# Patient Record
Sex: FEMALE | Race: BLACK OR AFRICAN AMERICAN | NOT HISPANIC OR LATINO | Employment: UNEMPLOYED | URBAN - METROPOLITAN AREA
[De-identification: names, ages, dates, MRNs, and addresses within clinical notes are randomized per-mention and may not be internally consistent; named-entity substitution may affect disease eponyms.]

---

## 2024-08-20 ENCOUNTER — HOSPITAL ENCOUNTER (EMERGENCY)
Facility: CLINIC | Age: 54
Discharge: HOME | End: 2024-08-20
Attending: EMERGENCY MEDICINE | Admitting: EMERGENCY MEDICINE
Payer: COMMERCIAL

## 2024-08-20 VITALS
HEIGHT: 67 IN | OXYGEN SATURATION: 100 % | HEART RATE: 86 BPM | WEIGHT: 172.4 LBS | SYSTOLIC BLOOD PRESSURE: 157 MMHG | TEMPERATURE: 97.5 F | RESPIRATION RATE: 20 BRPM | BODY MASS INDEX: 27.06 KG/M2 | DIASTOLIC BLOOD PRESSURE: 99 MMHG

## 2024-08-20 DIAGNOSIS — N12 PYELONEPHRITIS: ICD-10-CM

## 2024-08-20 DIAGNOSIS — M79.10 MYALGIA: ICD-10-CM

## 2024-08-20 LAB
ALBUMIN UR-MCNC: NEGATIVE MG/DL
ANION GAP SERPL CALCULATED.3IONS-SCNC: 12 MMOL/L (ref 7–15)
APPEARANCE UR: CLEAR
BACTERIA #/AREA URNS HPF: ABNORMAL /HPF
BASOPHILS # BLD AUTO: 0 10E3/UL (ref 0–0.2)
BASOPHILS NFR BLD AUTO: 0 %
BILIRUB UR QL STRIP: NEGATIVE
BUN SERPL-MCNC: 6.6 MG/DL (ref 6–20)
CALCIUM SERPL-MCNC: 9.3 MG/DL (ref 8.8–10.4)
CHLORIDE SERPL-SCNC: 104 MMOL/L (ref 98–107)
COLOR UR AUTO: ABNORMAL
CREAT SERPL-MCNC: 0.62 MG/DL (ref 0.51–0.95)
EGFRCR SERPLBLD CKD-EPI 2021: >90 ML/MIN/1.73M2
EOSINOPHIL # BLD AUTO: 0.1 10E3/UL (ref 0–0.7)
EOSINOPHIL NFR BLD AUTO: 1 %
ERYTHROCYTE [DISTWIDTH] IN BLOOD BY AUTOMATED COUNT: 12.5 % (ref 10–15)
GLUCOSE SERPL-MCNC: 93 MG/DL (ref 70–99)
GLUCOSE UR STRIP-MCNC: NEGATIVE MG/DL
HCO3 SERPL-SCNC: 24 MMOL/L (ref 22–29)
HCT VFR BLD AUTO: 43.6 % (ref 35–47)
HGB BLD-MCNC: 14.3 G/DL (ref 11.7–15.7)
HGB UR QL STRIP: NEGATIVE
HOLD SPECIMEN: NORMAL
HOLD SPECIMEN: NORMAL
IMM GRANULOCYTES # BLD: 0 10E3/UL
IMM GRANULOCYTES NFR BLD: 0 %
KETONES UR STRIP-MCNC: NEGATIVE MG/DL
LEUKOCYTE ESTERASE UR QL STRIP: ABNORMAL
LYMPHOCYTES # BLD AUTO: 2.6 10E3/UL (ref 0.8–5.3)
LYMPHOCYTES NFR BLD AUTO: 36 %
MCH RBC QN AUTO: 28.4 PG (ref 26.5–33)
MCHC RBC AUTO-ENTMCNC: 32.8 G/DL (ref 31.5–36.5)
MCV RBC AUTO: 87 FL (ref 78–100)
MONOCYTES # BLD AUTO: 0.6 10E3/UL (ref 0–1.3)
MONOCYTES NFR BLD AUTO: 8 %
NEUTROPHILS # BLD AUTO: 3.9 10E3/UL (ref 1.6–8.3)
NEUTROPHILS NFR BLD AUTO: 54 %
NITRATE UR QL: NEGATIVE
NRBC # BLD AUTO: 0 10E3/UL
NRBC BLD AUTO-RTO: 0 /100
PH UR STRIP: 7.5 [PH] (ref 5–7)
PLATELET # BLD AUTO: 281 10E3/UL (ref 150–450)
POTASSIUM SERPL-SCNC: 3.6 MMOL/L (ref 3.4–5.3)
RBC # BLD AUTO: 5.03 10E6/UL (ref 3.8–5.2)
RBC URINE: 0 /HPF
SODIUM SERPL-SCNC: 140 MMOL/L (ref 135–145)
SP GR UR STRIP: 1 (ref 1–1.03)
SQUAMOUS EPITHELIAL: <1 /HPF
UROBILINOGEN UR STRIP-MCNC: NORMAL MG/DL
WBC # BLD AUTO: 7.2 10E3/UL (ref 4–11)
WBC URINE: 12 /HPF

## 2024-08-20 PROCEDURE — 80048 BASIC METABOLIC PNL TOTAL CA: CPT | Performed by: EMERGENCY MEDICINE

## 2024-08-20 PROCEDURE — 99283 EMERGENCY DEPT VISIT LOW MDM: CPT

## 2024-08-20 PROCEDURE — 85025 COMPLETE CBC W/AUTO DIFF WBC: CPT | Performed by: EMERGENCY MEDICINE

## 2024-08-20 PROCEDURE — 36415 COLL VENOUS BLD VENIPUNCTURE: CPT | Performed by: EMERGENCY MEDICINE

## 2024-08-20 PROCEDURE — 81001 URINALYSIS AUTO W/SCOPE: CPT | Performed by: EMERGENCY MEDICINE

## 2024-08-20 PROCEDURE — 87086 URINE CULTURE/COLONY COUNT: CPT | Performed by: EMERGENCY MEDICINE

## 2024-08-20 RX ORDER — CEPHALEXIN 500 MG/1
500 CAPSULE ORAL 4 TIMES DAILY
Qty: 40 CAPSULE | Refills: 0 | Status: SHIPPED | OUTPATIENT
Start: 2024-08-20 | End: 2024-08-30

## 2024-08-20 ASSESSMENT — COLUMBIA-SUICIDE SEVERITY RATING SCALE - C-SSRS
1. IN THE PAST MONTH, HAVE YOU WISHED YOU WERE DEAD OR WISHED YOU COULD GO TO SLEEP AND NOT WAKE UP?: NO
2. HAVE YOU ACTUALLY HAD ANY THOUGHTS OF KILLING YOURSELF IN THE PAST MONTH?: NO
6. HAVE YOU EVER DONE ANYTHING, STARTED TO DO ANYTHING, OR PREPARED TO DO ANYTHING TO END YOUR LIFE?: NO

## 2024-08-20 ASSESSMENT — ACTIVITIES OF DAILY LIVING (ADL): ADLS_ACUITY_SCORE: 33

## 2024-08-20 NOTE — ED PROVIDER NOTES
"  Emergency Department Note      History of Present Illness     Chief Complaint   Headache and Dysuria    Interpretor used for history    HPI   Kiya Dominguez is a 54 year old female who presents for back pain localized to both her kidneys.  She endorses dysuria, headache, leg aches. She reports not having to pee frequently but then when she does it is very urgent. She reports constant pain with 1-2 minute breaks in the pain. She denies vomiting and fever.     Independent Historian   interpretor as detailed above.    Review of External Notes   I reviewed care everywhere and updated epic.    Past Medical History     Medical History and Problem List   No past medical history on file.    Medications   No current outpatient medications on file.    Surgical History   No past surgical history on file.    Physical Exam     Patient Vitals for the past 24 hrs:   BP Temp Pulse Resp SpO2 Height Weight   08/20/24 1621  157/99 97.5  F (36.4  C) 86 20 100 % 1.702 m (5' 7\") 78.2 kg (172 lb 6.4 oz)     Physical Exam  Nursing note and vitals reviewed.  Constitutional: Cooperative.  Sitting up comfortably  HENT:   Mouth/Throat: Mucous membranes are normal.  No neck rigidity  Cardiovascular: Normal rate, regular rhythm and normal heart sounds.  No murmur.  Pulmonary/Chest: Effort normal and breath sounds normal. No respiratory distress. No wheezes. No rales.   Abdominal: Soft. Normal appearance and bowel sounds are normal. No distension. There is no tenderness. There is no rigidity and no guarding.  Mild CVA tenderness more prominent on the left  Neurological: Alert.  Oriented x 3  Skin: Skin is warm and dry. No rash noted on back.   Psychiatric: Normal mood and affect.       Diagnostics     Lab Results   Labs Ordered and Resulted from Time of ED Arrival to Time of ED Departure   ROUTINE UA WITH MICROSCOPIC - Abnormal       Result Value    Color Urine Straw      Appearance Urine Clear      Glucose Urine Negative      Bilirubin Urine " Negative      Ketones Urine Negative      Specific Gravity Urine 1.005      Blood Urine Negative      pH Urine 7.5 (*)     Protein Albumin Urine Negative      Urobilinogen Urine Normal      Nitrite Urine Negative      Leukocyte Esterase Urine Small (*)     Bacteria Urine Few (*)     RBC Urine 0      WBC Urine 12 (*)     Squamous Epithelials Urine <1     BASIC METABOLIC PANEL - Normal    Sodium 140      Potassium 3.6      Chloride 104      Carbon Dioxide (CO2) 24      Anion Gap 12      Urea Nitrogen 6.6      Creatinine 0.62      GFR Estimate >90      Calcium 9.3      Glucose 93     CBC WITH PLATELETS AND DIFFERENTIAL    WBC Count 7.2      RBC Count 5.03      Hemoglobin 14.3      Hematocrit 43.6      MCV 87      MCH 28.4      MCHC 32.8      RDW 12.5      Platelet Count 281      % Neutrophils 54      % Lymphocytes 36      % Monocytes 8      % Eosinophils 1      % Basophils 0      % Immature Granulocytes 0      NRBCs per 100 WBC 0      Absolute Neutrophils 3.9      Absolute Lymphocytes 2.6      Absolute Monocytes 0.6      Absolute Eosinophils 0.1      Absolute Basophils 0.0      Absolute Immature Granulocytes 0.0      Absolute NRBCs 0.0     URINE CULTURE: Pending     Imaging   No orders to display     ED Course      Medications Administered   Medications - No data to display    Discussion of Management   None    ED Course   ED Course as of 08/20/24 1843   Tue Aug 20, 2024   1835 I obtained history and examined the patient as noted above         Additional Documentation  None    Medical Decision Making / Diagnosis     KYLER Dominguez is a 54 year old female who presents with back pain and urinary frequency symptoms.  Workup most consistent with urinary source infection and given back pain I would err on the side of an extended antibiotic course to treat for possible pyelonephritis.  I would attribute her myalgias to this infection.  No other findings on exam or by history concerning for secondary infection.  Basic  labs are reassuring.  No evidence of severe sepsis.  She has not needed any pain medication here.  Plan of care will be outpatient antibiotics with close primary care follow-up and return precautions if worsening in any way    Disposition   The patient was discharged.     Diagnosis     ICD-10-CM    1. Pyelonephritis  N12       2. Myalgias  M79.10            Discharge Medications   Discharge Medication List as of 8/20/2024  6:58 PM        START taking these medications    Details   cephALEXin (KEFLEX) 500 MG capsule Take 1 capsule (500 mg) by mouth 4 times daily for 10 days, Disp-40 capsule, R-0, Local Print               Scribe Disclosure:  I, Axel Wood, am serving as a scribe at 6:23 PM on 8/20/2024 to document services personally performed by Ilya Mclaughlin MD based on my observations and the provider's statements to me.        Ilya Mclaughlin MD  08/20/24 [id number]

## 2024-08-20 NOTE — ED TRIAGE NOTES
Pt arrives for headache that radiates to the neck, down the back and to the legs starting yesterday. Yesterday pt had some bilateral flank pain as well, she reports dysuria. She states she did take a pain medication PTA but doesn't know which one. SRIRAM on RA.

## 2024-08-22 LAB — BACTERIA UR CULT: NORMAL
